# Patient Record
Sex: FEMALE | Race: WHITE | URBAN - METROPOLITAN AREA
[De-identification: names, ages, dates, MRNs, and addresses within clinical notes are randomized per-mention and may not be internally consistent; named-entity substitution may affect disease eponyms.]

---

## 2017-06-18 ENCOUNTER — OFFICE VISIT (OUTPATIENT)
Dept: URGENT CARE | Facility: URGENT CARE | Age: 67
End: 2017-06-18
Payer: MEDICARE

## 2017-06-18 VITALS
TEMPERATURE: 98 F | DIASTOLIC BLOOD PRESSURE: 85 MMHG | OXYGEN SATURATION: 96 % | SYSTOLIC BLOOD PRESSURE: 130 MMHG | HEART RATE: 71 BPM

## 2017-06-18 DIAGNOSIS — H10.31 ACUTE BACTERIAL CONJUNCTIVITIS OF RIGHT EYE: Primary | ICD-10-CM

## 2017-06-18 PROCEDURE — 99202 OFFICE O/P NEW SF 15 MIN: CPT | Performed by: PHYSICIAN ASSISTANT

## 2017-06-18 RX ORDER — KETOCONAZOLE 20 MG/ML
SHAMPOO TOPICAL
COMMUNITY
Start: 2017-03-08

## 2017-06-18 RX ORDER — MOXIFLOXACIN 5 MG/ML
1 SOLUTION/ DROPS OPHTHALMIC
Qty: 1 BOTTLE | Refills: 0 | Status: SHIPPED | OUTPATIENT
Start: 2017-06-18 | End: 2017-06-25

## 2017-06-18 RX ORDER — LISINOPRIL 20 MG/1
TABLET ORAL
COMMUNITY
Start: 2017-04-11

## 2017-06-18 RX ORDER — MUPIROCIN 20 MG/G
OINTMENT TOPICAL
COMMUNITY
Start: 2016-12-30

## 2017-06-18 ASSESSMENT — ENCOUNTER SYMPTOMS
FEVER: 0
MYALGIAS: 0
COUGH: 0
SHORTNESS OF BREATH: 0
EYE REDNESS: 1
BLURRED VISION: 0
PALPITATIONS: 0
CHILLS: 0
HEADACHES: 0
ABDOMINAL PAIN: 0
VOMITING: 0
DIARRHEA: 0
WHEEZING: 0
EYE PAIN: 0
EYE DISCHARGE: 1
SORE THROAT: 0
NAUSEA: 0

## 2017-06-18 NOTE — NURSING NOTE
Chief Complaint   Patient presents with     Conjunctivitis     RT eye Redness x 2days     There is no height or weight on file to calculate BMI.  BP Readings from Last 1 Encounters:   06/18/17 130/85   ]  BP cuff size:  regular  Do you feel safe in your environment?  Yes  Does the patient need any medication refills today? No    C/O RT eye redness, itchy with allergy eye gtts, green mattery. Sejal Ghosh CMA

## 2017-06-18 NOTE — MR AVS SNAPSHOT
"              After Visit Summary   6/18/2017    Antonina Hidalgo    MRN: [id number]           Patient Information     Date Of Birth          1950        Visit Information        Provider Department      6/18/2017 11:05 AM Jayna Chatterjee PA-C [facility]        Today's Diagnoses     Acute bacterial conjunctivitis of right eye    -  1      Care Instructions    Will treat with vigamox. Avoid touching face and wash hands thoroughly. Return to clinic if symptoms worsen or do not improve; otherwise follow up as needed            Follow-ups after your visit        Follow-up notes from your care team     Return if symptoms worsen or fail to improve.      Who to contact     If you have questions or need follow up information about today's clinic visit or your schedule please contact [facility] directly at [phone].  Normal or non-critical lab and imaging results will be communicated to you by MyChart, letter or phone within 4 business days after the clinic has received the results. If you do not hear from us within 7 days, please contact the clinic through MyChart or phone. If you have a critical or abnormal lab result, we will notify you by phone as soon as possible.  Submit refill requests through [employer] or call your pharmacy and they will forward the refill request to us. Please allow 3 business days for your refill to be completed.          Additional Information About Your Visit        pMediaNetworkhart Information     [employer] lets you send messages to your doctor, view your test results, renew your prescriptions, schedule appointments and more. To sign up, go to [email or web address] . Click on \"Log in\" on the left side of the screen, which will take you to the Welcome page. Then click on \"Sign up Now\" on the right side of the page.     You will be asked to enter the access code listed below, as well as some personal information. Please follow the directions to create your username " and password.     Your access code is: [email or web address]  Expires: 2017  2:19 PM     Your access code will  in 90 days. If you need help or a new code, please call your [place] clinic or [phone].        Care EveryWhere ID     This is your Care EveryWhere ID. This could be used by other organizations to access your [place] medical records  [id number]        Your Vitals Were     Pulse Temperature Pulse Oximetry Breastfeeding?          71 98  F (36.7  C) (Tympanic) 96% No         Blood Pressure from Last 3 Encounters:   17 130/85    Weight from Last 3 Encounters:   No data found for Wt              Today, you had the following     No orders found for display         Today's Medication Changes          These changes are accurate as of: 17  2:19 PM.  If you have any questions, ask your nurse or doctor.               Start taking these medicines.        Dose/Directions    moxifloxacin 0.5 % ophthalmic solution   Commonly known as:  VIGAMOX   Used for:  Acute bacterial conjunctivitis of right eye        Dose:  1 drop   Apply 1 drop to eye every 4 hours (while awake) for 7 days   Quantity:  1 Bottle   Refills:  0            Where to get your medicines      These medications were sent to [facility]/pharmacy #[id number] [address] [address]     Phone:  [phone]     moxifloxacin 0.5 % ophthalmic solution                Primary Care Provider    None Specified       No primary provider on file.        Thank you!     [facility] you [facility]. Our goal is always to provide you with excellent care. Hearing back from our patients is one way we can continue to improve our services. Please take a few minutes to complete the written survey that you may receive in the mail after your visit with us. Thank you!             Your Updated Medication List - Protect others around you: Learn how to safely use, store and throw away  your medicines at www.disposemymeds.org.          This list is accurate as of: 6/18/17  2:19 PM.  Always use your most recent med list.                   Brand Name Dispense Instructions for use    ketoconazole 2 % shampoo    NIZORAL         lisinopril 20 MG tablet    PRINIVIL/ZESTRIL         moxifloxacin 0.5 % ophthalmic solution    VIGAMOX    1 Bottle    Apply 1 drop to eye every 4 hours (while awake) for 7 days       mupirocin 2 % ointment    BACTROBAN         ranitidine 150 MG tablet    ZANTAC

## 2017-06-18 NOTE — PATIENT INSTRUCTIONS
Will treat with vigamox. Avoid touching face and wash hands thoroughly. Return to clinic if symptoms worsen or do not improve; otherwise follow up as needed

## 2017-06-18 NOTE — PROGRESS NOTES
SUBJECTIVE:   Antonina Hidalgo is a 66 year old female who presents to clinic today for the following health issues:     Patient presents with:  Conjunctivitis: RT eye Redness x 2days      SYMPTOMS: red right eye with discharge. Has seen numerous eye doctors in the past. Vigamox has worked in the past but she forgot her bottle at home in [place] and is requesting a prescription for that medication. No eye injury. No severe eye pain or changes in vision. She does have a runny nose and mild cold symptoms.   DURATION: 2 days  APPETITE: normal  ACTIVITY LEVEL: normal   OTC MEDICATIONS TRIED: none  SICK CONTACTS: none  DENIES: fever/chills, difficulty breathing, N/V/D, or rash    Problem list and histories reviewed & adjusted, as indicated.  Additional history: none    There is no problem list on file for this patient.    No past surgical history on file.    Social History   Substance Use Topics     Smoking status: Not on file     Smokeless tobacco: Not on file     Alcohol use Not on file     No family history on file.      Current Outpatient Prescriptions   Medication Sig Dispense Refill     moxifloxacin (VIGAMOX) 0.5 % ophthalmic solution Apply 1 drop to eye every 4 hours (while awake) for 7 days 1 Bottle 0     lisinopril (PRINIVIL/ZESTRIL) 20 MG tablet        ranitidine (ZANTAC) 150 MG tablet        ketoconazole (NIZORAL) 2 % shampoo        mupirocin (BACTROBAN) 2 % ointment        Allergies   Allergen Reactions     Amoxicillin Itching     Penicillins Unknown       Reviewed and updated as needed this visit by clinical staff  Allergies  Meds  Problems       Reviewed and updated as needed this visit by Provider  Allergies  Meds  Problems         ROS:  Review of Systems   Constitutional: Negative for chills, fever and malaise/fatigue.   HENT: Negative for congestion, ear pain and sore throat.    Eyes: Positive for discharge and redness. Negative for blurred vision and pain.   Respiratory: Negative for cough,  shortness of breath and wheezing.    Cardiovascular: Negative for chest pain and palpitations.   Gastrointestinal: Negative for abdominal pain, diarrhea, nausea and vomiting.   Musculoskeletal: Negative for joint pain and myalgias.   Skin: Negative for rash.   Neurological: Negative for headaches.         OBJECTIVE:   /85 (BP Location: Right arm, Patient Position: Chair, Cuff Size: Adult Regular)  Pulse 71  Temp 98  F (36.7  C) (Tympanic)  SpO2 96%  Breastfeeding? No  There is no height or weight on file to calculate BMI.  EXAM:  Physical Exam   Constitutional: She is well-developed, well-nourished, and in no distress.   HENT:   Head: Normocephalic.   Right Ear: Tympanic membrane and ear canal normal.   Left Ear: Tympanic membrane and ear canal normal.   Mouth/Throat: Oropharynx is clear and moist.   Eyes: Pupils are equal, round, and reactive to light. Right eye exhibits discharge. Left eye exhibits no discharge. Right conjunctiva is injected. Left conjunctiva is not injected.   Cardiovascular: Normal rate, regular rhythm and normal heart sounds.    Pulmonary/Chest: Effort normal and breath sounds normal.   Skin: No rash noted.   Psychiatric:   Alert and cooperative       Diagnostic Test Results:  No results found for this or any previous visit (from the past 24 hour(s)).     ASSESSMENT/PLAN:     DIAGNOSIS:    1. Acute bacterial conjunctivitis of right eye    - moxifloxacin (VIGAMOX) 0.5 % ophthalmic solution; Apply 1 drop to eye every 4 hours (while awake) for 7 days  Dispense: 1 Bottle; Refill: 0     Patient Instructions   Will treat with vigamox. Avoid touching face and wash hands thoroughly. Return to clinic if symptoms worsen or do not improve; otherwise follow up as needed      Jayna Chatterjee PA-C  [facility]